# Patient Record
Sex: MALE | Race: WHITE | Employment: FULL TIME | URBAN - NONMETROPOLITAN AREA
[De-identification: names, ages, dates, MRNs, and addresses within clinical notes are randomized per-mention and may not be internally consistent; named-entity substitution may affect disease eponyms.]

---
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
## 2025-02-11 ENCOUNTER — OFFICE VISIT (OUTPATIENT)
Age: 20
End: 2025-02-11

## 2025-02-11 ENCOUNTER — HOSPITAL ENCOUNTER (OUTPATIENT)
Age: 20
Discharge: HOME | End: 2025-02-11
Payer: COMMERCIAL

## 2025-02-11 VITALS
HEIGHT: 68 IN | SYSTOLIC BLOOD PRESSURE: 112 MMHG | HEART RATE: 83 BPM | OXYGEN SATURATION: 99 % | TEMPERATURE: 98.2 F | BODY MASS INDEX: 22.91 KG/M2 | WEIGHT: 151.2 LBS | DIASTOLIC BLOOD PRESSURE: 68 MMHG

## 2025-02-11 DIAGNOSIS — Z11.59 NEED FOR HEPATITIS C SCREENING TEST: ICD-10-CM

## 2025-02-11 DIAGNOSIS — Z76.89 ENCOUNTER TO ESTABLISH CARE: Primary | ICD-10-CM

## 2025-02-11 DIAGNOSIS — Z11.3 SCREEN FOR STD (SEXUALLY TRANSMITTED DISEASE): ICD-10-CM

## 2025-02-11 DIAGNOSIS — Z11.4 SCREENING FOR HIV WITHOUT PRESENCE OF RISK FACTORS: ICD-10-CM

## 2025-02-11 LAB
HCV IGG SERPL QL IA: NEGATIVE
RPR SER QL: NONREACTIVE

## 2025-02-11 PROCEDURE — 36415 COLL VENOUS BLD VENIPUNCTURE: CPT

## 2025-02-11 PROCEDURE — 87389 HIV-1 AG W/HIV-1&-2 AB AG IA: CPT

## 2025-02-11 PROCEDURE — 86803 HEPATITIS C AB TEST: CPT

## 2025-02-11 PROCEDURE — 87591 N.GONORRHOEAE DNA AMP PROB: CPT

## 2025-02-11 PROCEDURE — 87491 CHLMYD TRACH DNA AMP PROBE: CPT

## 2025-02-11 PROCEDURE — 87661 TRICHOMONAS VAGINALIS AMPLIF: CPT

## 2025-02-11 PROCEDURE — 86592 SYPHILIS TEST NON-TREP QUAL: CPT

## 2025-02-11 SDOH — HEALTH STABILITY: PHYSICAL HEALTH: ON AVERAGE, HOW MANY MINUTES DO YOU ENGAGE IN EXERCISE AT THIS LEVEL?: 150+ MIN

## 2025-02-11 SDOH — ECONOMIC STABILITY: FOOD INSECURITY: WITHIN THE PAST 12 MONTHS, YOU WORRIED THAT YOUR FOOD WOULD RUN OUT BEFORE YOU GOT MONEY TO BUY MORE.: NEVER TRUE

## 2025-02-11 SDOH — ECONOMIC STABILITY: FOOD INSECURITY: WITHIN THE PAST 12 MONTHS, THE FOOD YOU BOUGHT JUST DIDN'T LAST AND YOU DIDN'T HAVE MONEY TO GET MORE.: NEVER TRUE

## 2025-02-11 SDOH — HEALTH STABILITY: PHYSICAL HEALTH: ON AVERAGE, HOW MANY DAYS PER WEEK DO YOU ENGAGE IN MODERATE TO STRENUOUS EXERCISE (LIKE A BRISK WALK)?: 5 DAYS

## 2025-02-11 ASSESSMENT — PATIENT HEALTH QUESTIONNAIRE - PHQ9
2. FEELING DOWN, DEPRESSED OR HOPELESS: NOT AT ALL
SUM OF ALL RESPONSES TO PHQ QUESTIONS 1-9: 0
SUM OF ALL RESPONSES TO PHQ9 QUESTIONS 1 & 2: 0
SUM OF ALL RESPONSES TO PHQ QUESTIONS 1-9: 0
SUM OF ALL RESPONSES TO PHQ QUESTIONS 1-9: 0
1. LITTLE INTEREST OR PLEASURE IN DOING THINGS: NOT AT ALL
SUM OF ALL RESPONSES TO PHQ QUESTIONS 1-9: 0

## 2025-02-11 ASSESSMENT — ENCOUNTER SYMPTOMS
RESPIRATORY NEGATIVE: 1
EYES NEGATIVE: 1
GASTROINTESTINAL NEGATIVE: 1
ABDOMINAL PAIN: 0

## 2025-02-11 NOTE — PROGRESS NOTES
[facility] PROFESSIONAL [place] [id number] [facility] [address]  Dept: [phone]  Loc: [phone]     Billy Begum (:  2005) is a 19 y.o. male, here for evaluation of the following chief complaint(s):  New Patient (Wants STD check, and sperm count, Having issues hearing)      ASSESSMENT/PLAN:  1. Encounter to establish care  2. Screening for HIV without presence of risk factors  -     HIV Screen; Future  3. Need for hepatitis C screening test  -     Hepatitis C Antibody; Future  4. Screen for STD (sexually transmitted disease)  -     C.trachomatis N.gonorrhoeae DNA; Future  -     Trichomonas Screen; Future  -     RPR; Future    We did discuss condom usage.  Patient is here for screening and establishing care only.  No symptoms currently.  No indications to collect sperm count.  May call patient with results of labs ordered.    Return in about 1 year (around 2026) for as needed.    SUBJECTIVE/OBJECTIVE:  Sexually Transmitted Diseases  The patient's pertinent negatives include no genital lesions, penile discharge, penile pain or scrotal swelling. Pertinent negatives include no abdominal pain, dysuria, fever, frequency, hematuria, painful intercourse or rash. He is sexually active. He consistently uses condoms. It is unknown whether or not his partner has an STD. There is no history of chlamydia, erectile dysfunction, gonorrhea, herpes simplex, HIV or syphilis.     Patient like to establish care with a PCP.  He currently attends [employer] for college.  He denies any previous medical history.  Does not take any medications on a daily basis.    Review of Systems   Constitutional:  Negative for fever.   HENT: Negative.     Eyes: Negative.    Respiratory: Negative.     Cardiovascular: Negative.    Gastrointestinal: Negative.  Negative for abdominal pain.   Endocrine: Negative for polydipsia, polyphagia and polyuria.   Genitourinary:  Negative for dysuria,

## 2025-02-12 DIAGNOSIS — A74.9 CHLAMYDIA: Primary | ICD-10-CM

## 2025-02-12 LAB
CHLAMYDIA DNA UR QL NAA+PROBE: ABNORMAL
CHLAMYDIA, GC DNA AMP, URINE: ABNORMAL
HIV 1+2 AB+HIV1 P24 AG SERPL QL IA: NORMAL
N GONORRHOEA DNA UR QL NAA+PROBE: NEGATIVE
SOURCE: NORMAL
TRICHOMONAS VAGINALI, MOLECULAR: NEGATIVE

## 2025-02-12 RX ORDER — DOXYCYCLINE HYCLATE 100 MG
100 TABLET ORAL 2 TIMES DAILY
Qty: 14 TABLET | Refills: 0 | Status: SHIPPED | OUTPATIENT
Start: 2025-02-12 | End: 2025-02-19

## 2025-02-12 NOTE — TELEPHONE ENCOUNTER
Patient returned phone call.     Patient notified of positive result.     [facility] on [address] added as preferred pharmacy.    Patient had no further questions or concerns.     Please send to Pharmacy.

## 2025-02-12 NOTE — TELEPHONE ENCOUNTER
Please notify patient his Chlamydia test is positive and would like to send Doxycycline to a pharmacy of his choosing.    We are still waiting on a few tests to result.  He should refrain from sex for the next 10 days.  Notify partners.

## 2025-02-13 ENCOUNTER — OFFICE VISIT (OUTPATIENT)
Age: 20
End: 2025-02-13

## 2025-02-13 VITALS
BODY MASS INDEX: 23.83 KG/M2 | DIASTOLIC BLOOD PRESSURE: 82 MMHG | SYSTOLIC BLOOD PRESSURE: 122 MMHG | OXYGEN SATURATION: 98 % | HEIGHT: 67 IN | WEIGHT: 151.8 LBS | HEART RATE: 108 BPM | TEMPERATURE: 98.1 F

## 2025-02-13 DIAGNOSIS — J10.1 INFLUENZA A: Primary | ICD-10-CM

## 2025-02-13 DIAGNOSIS — A74.9 CHLAMYDIA: ICD-10-CM

## 2025-02-13 LAB
INFLUENZA VIRUS A RNA: POSITIVE
INFLUENZA VIRUS B RNA: NEGATIVE

## 2025-02-13 RX ORDER — GUAIFENESIN/DEXTROMETHORPHAN 100-10MG/5
5 SYRUP ORAL 3 TIMES DAILY PRN
Qty: 120 ML | Refills: 0 | Status: SHIPPED | OUTPATIENT
Start: 2025-02-13 | End: 2025-02-23

## 2025-02-13 SDOH — ECONOMIC STABILITY: FOOD INSECURITY: WITHIN THE PAST 12 MONTHS, YOU WORRIED THAT YOUR FOOD WOULD RUN OUT BEFORE YOU GOT MONEY TO BUY MORE.: NEVER TRUE

## 2025-02-13 SDOH — ECONOMIC STABILITY: FOOD INSECURITY: WITHIN THE PAST 12 MONTHS, THE FOOD YOU BOUGHT JUST DIDN'T LAST AND YOU DIDN'T HAVE MONEY TO GET MORE.: NEVER TRUE

## 2025-02-13 ASSESSMENT — PATIENT HEALTH QUESTIONNAIRE - PHQ9
2. FEELING DOWN, DEPRESSED OR HOPELESS: NOT AT ALL
SUM OF ALL RESPONSES TO PHQ9 QUESTIONS 1 & 2: 0
SUM OF ALL RESPONSES TO PHQ QUESTIONS 1-9: 0
SUM OF ALL RESPONSES TO PHQ QUESTIONS 1-9: 0
1. LITTLE INTEREST OR PLEASURE IN DOING THINGS: NOT AT ALL
SUM OF ALL RESPONSES TO PHQ QUESTIONS 1-9: 0
SUM OF ALL RESPONSES TO PHQ QUESTIONS 1-9: 0

## 2025-02-13 ASSESSMENT — ENCOUNTER SYMPTOMS
SINUS PRESSURE: 0
SINUS PAIN: 0
SORE THROAT: 1
DIARRHEA: 1
ABDOMINAL PAIN: 0
COUGH: 1
RHINORRHEA: 1

## 2025-02-13 NOTE — PATIENT INSTRUCTIONS
The medication list included in this document is our record of what you are currently taking, including any changes that were made at today's visit.  If you find any differences when compared to your medications at home, or have any questions that were not answered at your visit, please contact the office.     I have recommend the following symptomatic treatment for influenza: push fluids, use a vaporizer, use Tylenol or OTC NSAID's (Advil, Alleve etc) for fever or achiness, OTC cough suppressant/decongestants such as Robitussin DM and rest. The symptoms usually resolve in 7-10 days. Call for appointment if symptoms persist or if develops new symptoms such as wheezing or shortness of breath.

## 2025-02-13 NOTE — PROGRESS NOTES
syrup     Sig: Take 5 mLs by mouth 3 times daily as needed for Cough     Dispense:  120 mL     Refill:  0      Continue Doxy. Refrain from sexual intercourse for the next week.  Discussed condom usage and safe sex.   Rapid strep test is negative.  I have recommend the following symptomatic treatment for influenza: push fluids, use a vaporizer, use Tylenol or OTC NSAID's (Advil, Alleve etc) for fever or achiness, OTC cough suppressant/decongestants such as Robitussin DM and rest. The symptoms usually resolve in 4-6 days. Call for appointment if symptoms persist or if develops new symptoms such as wheezing or shortness of breath.      Electronically signed by RIVAS Llamas CNP on 2/13/2025 at 2:50 PM

## 2025-02-19 ENCOUNTER — TELEPHONE (OUTPATIENT)
Age: 20
End: 2025-02-19

## 2025-02-19 NOTE — TELEPHONE ENCOUNTER
Patient called asking question regarding length of medication for doxycycline. Stated it should be for 7 days and that he is already out of medication. I calculated and explained to patient he picked medications up on 2/12/25, yesterday would have been the last dose to complete the 7 days. Patient voiced understanding. Now patient is concerned about needing retesting done.     Please advise.

## 2025-02-20 NOTE — TELEPHONE ENCOUNTER
Called Patient and explained what Jay Darby said. Patient stated he understood. Also educated patient on waiting 10 days after his and his partners medication is completed to continue with any intercourse.     No other concerns noted.